# Patient Record
Sex: MALE | Race: BLACK OR AFRICAN AMERICAN | Employment: UNEMPLOYED | URBAN - METROPOLITAN AREA
[De-identification: names, ages, dates, MRNs, and addresses within clinical notes are randomized per-mention and may not be internally consistent; named-entity substitution may affect disease eponyms.]

---

## 2021-03-30 ENCOUNTER — HOSPITAL ENCOUNTER (EMERGENCY)
Facility: CLINIC | Age: 36
Discharge: PSYCHIATRIC OR SUBSTANCE USE FACILITY | End: 2021-03-30
Attending: INTERNAL MEDICINE | Admitting: INTERNAL MEDICINE
Payer: MEDICAID

## 2021-03-30 ENCOUNTER — AMBULATORY - HEALTHEAST (OUTPATIENT)
Dept: CARE COORDINATION | Facility: HOSPITAL | Age: 36
End: 2021-03-30

## 2021-03-30 VITALS
BODY MASS INDEX: 25.9 KG/M2 | OXYGEN SATURATION: 98 % | WEIGHT: 185 LBS | DIASTOLIC BLOOD PRESSURE: 100 MMHG | HEART RATE: 61 BPM | RESPIRATION RATE: 18 BRPM | TEMPERATURE: 98 F | HEIGHT: 71 IN | SYSTOLIC BLOOD PRESSURE: 127 MMHG

## 2021-03-30 DIAGNOSIS — F22 PARANOID DISORDER (H): ICD-10-CM

## 2021-03-30 DIAGNOSIS — R45.851 SUICIDAL IDEATION: ICD-10-CM

## 2021-03-30 LAB
ALBUMIN UR-MCNC: 10 MG/DL
AMPHETAMINES UR QL SCN: POSITIVE
APPEARANCE UR: CLEAR
BARBITURATES UR QL: NEGATIVE
BENZODIAZ UR QL: NEGATIVE
BILIRUB UR QL STRIP: NEGATIVE
CANNABINOIDS UR QL SCN: POSITIVE
COCAINE UR QL: POSITIVE
COLOR UR AUTO: YELLOW
GLUCOSE UR STRIP-MCNC: NEGATIVE MG/DL
HGB UR QL STRIP: NEGATIVE
KETONES UR STRIP-MCNC: NEGATIVE MG/DL
LABORATORY COMMENT REPORT: NORMAL
LEUKOCYTE ESTERASE UR QL STRIP: NEGATIVE
MUCOUS THREADS #/AREA URNS LPF: PRESENT /LPF
NITRATE UR QL: NEGATIVE
OPIATES UR QL SCN: NEGATIVE
PCP UR QL SCN: NEGATIVE
PH UR STRIP: 5.5 PH (ref 5–7)
RBC #/AREA URNS AUTO: 1 /HPF (ref 0–2)
SARS-COV-2 RNA RESP QL NAA+PROBE: NEGATIVE
SOURCE: ABNORMAL
SP GR UR STRIP: 1.03 (ref 1–1.03)
SPECIMEN SOURCE: NORMAL
SQUAMOUS #/AREA URNS AUTO: <1 /HPF (ref 0–1)
UROBILINOGEN UR STRIP-MCNC: NORMAL MG/DL (ref 0–2)
WBC #/AREA URNS AUTO: 3 /HPF (ref 0–5)

## 2021-03-30 PROCEDURE — 80307 DRUG TEST PRSMV CHEM ANLYZR: CPT | Performed by: INTERNAL MEDICINE

## 2021-03-30 PROCEDURE — 99285 EMERGENCY DEPT VISIT HI MDM: CPT | Mod: 25

## 2021-03-30 PROCEDURE — 81001 URINALYSIS AUTO W/SCOPE: CPT | Mod: XU | Performed by: INTERNAL MEDICINE

## 2021-03-30 PROCEDURE — 87635 SARS-COV-2 COVID-19 AMP PRB: CPT | Performed by: INTERNAL MEDICINE

## 2021-03-30 PROCEDURE — 250N000013 HC RX MED GY IP 250 OP 250 PS 637: Performed by: INTERNAL MEDICINE

## 2021-03-30 PROCEDURE — 90791 PSYCH DIAGNOSTIC EVALUATION: CPT

## 2021-03-30 PROCEDURE — C9803 HOPD COVID-19 SPEC COLLECT: HCPCS

## 2021-03-30 RX ORDER — HYDROXYZINE HYDROCHLORIDE 25 MG/1
25 TABLET, FILM COATED ORAL EVERY 4 HOURS PRN
Status: DISCONTINUED | OUTPATIENT
Start: 2021-03-30 | End: 2021-03-31 | Stop reason: HOSPADM

## 2021-03-30 RX ORDER — IBUPROFEN 600 MG/1
600 TABLET, FILM COATED ORAL EVERY 4 HOURS PRN
Status: DISCONTINUED | OUTPATIENT
Start: 2021-03-30 | End: 2021-03-31 | Stop reason: HOSPADM

## 2021-03-30 RX ORDER — QUETIAPINE FUMARATE 200 MG/1
200 TABLET, FILM COATED ORAL AT BEDTIME
COMMUNITY
Start: 2021-03-15

## 2021-03-30 RX ORDER — ACETAMINOPHEN 325 MG/1
650 TABLET ORAL EVERY 4 HOURS PRN
Status: DISCONTINUED | OUTPATIENT
Start: 2021-03-30 | End: 2021-03-31 | Stop reason: HOSPADM

## 2021-03-30 RX ORDER — OLANZAPINE 5 MG/1
10 TABLET, ORALLY DISINTEGRATING ORAL 2 TIMES DAILY PRN
Status: DISCONTINUED | OUTPATIENT
Start: 2021-03-30 | End: 2021-03-31 | Stop reason: HOSPADM

## 2021-03-30 RX ADMIN — OLANZAPINE 10 MG: 5 TABLET, ORALLY DISINTEGRATING ORAL at 18:07

## 2021-03-30 ASSESSMENT — MIFFLIN-ST. JEOR: SCORE: 1806.28

## 2021-03-30 NOTE — ED TRIAGE NOTES
Pt here with suicidal ideation, reports he has had thoughts of uselessness and plan of following through at times. He has had 2 suicide attempts most recently 1 month he was at [facility]. Pt has persistent thoughts. VSS.

## 2021-03-30 NOTE — ED PROVIDER NOTES
"  History   Chief Complaint:  Suicidal     HPI   Hernandez Almanzar is a 35 year old male with history of suicidal ideations, substance abuse, depression and bipolar 1 disorder who presents with suicidal ideations. In the past ten weeks the patient has been seen at the ED eight times for mental health issues and admitted once. Today, the patient states that he has experienced thoughts of uselessness and feels as though people  him but don't fully understand his story. Secondary to this he has had thoughts of jumping off a bridge. He has had two suicide attempts in the past. The most recent being 1 month ago. He is on medications to help his mental health but has not been taking them. He states that he takes whatever drugs he can get, took meth two days ago and marijuana today. He has not seen a specialist for these mental health issues. He denies any fever, cough or vomiting.    Review of Systems   Psychiatric/Behavioral: Positive for suicidal ideas.   All other systems reviewed and are negative.    Allergies:  Divalproex sodium  Fish containing products    Medications:  Seroquel  Melatonin    Past Medical History:    Tobacco use  Methamphetamine dependence  Bipolar 1 disorder  Major depression    Social History:  The patient was unaccompanied to the ED.  Substance use as above    Physical Exam     Patient Vitals for the past 24 hrs:   BP Temp Temp src Pulse Resp SpO2 Height Weight   03/30/21 1838 -- -- -- -- -- 98 % -- --   03/30/21 1837 (!) 145/86 -- -- 70 -- -- -- --   03/30/21 1420 138/84 -- -- -- -- -- -- --   03/30/21 1347 -- -- -- -- -- -- 1.803 m (5' 11\") 83.9 kg (185 lb)   03/30/21 1343 (!) 157/106 98  F (36.7  C) Temporal 101 18 98 % -- --       Physical Exam  HENT:      Mouth/Throat:      Pharynx: No posterior oropharyngeal erythema.   Eyes:      Conjunctiva/sclera: Conjunctivae normal.   Neck:      Musculoskeletal: Neck supple.   Cardiovascular:      Rate and Rhythm: Normal rate and regular rhythm. "      Heart sounds: Normal heart sounds.   Pulmonary:      Effort: Pulmonary effort is normal.      Breath sounds: Normal breath sounds.   Abdominal:      General: Bowel sounds are normal. There is no distension.      Palpations: Abdomen is soft.      Tenderness: There is no abdominal tenderness. There is no guarding or rebound.   Musculoskeletal: Normal range of motion.   Skin:     General: Skin is warm and dry.   Neurological:      Mental Status: He is alert.   Psychiatric:         Thought Content: Thought content includes suicidal ideation. Thought content includes suicidal plan.      Comments: Seems suspicious  Difficult to understand, slow to answer         Emergency Department Course   Laboratory:  UA with Microscopic: Protein Albumin 10 (A), Mucous Urine Present (A) o/w Negative    Drug abuse screen 77 urine: Amphetamine Positive (A), Cannabinoids Positive (A), Cocaine Positive (A) o/w WNL    Asymptomatic COVID-19 Virus (Coronavirus) by PCR Nasopharyngeal swab: Negative    Emergency Department Course:  Reviewed:  I reviewed nursing notes, vitals, past medical history and care everywhere    Assessments:  1430 I obtained history and examined the patient as noted above.     I rechecked and updated the patient regarding the laboratory results and the plan for care..    Consults:   9261 I spoke with DEC regarding the patient.    Interventions:  1807 Zyprexa 10mg PO    Disposition:  Transfer    Impression & Plan   Covid-19  Hernandez Almanzar was evaluated during a global COVID-19 pandemic, which necessitated consideration that the patient might be at risk for infection with the SARS-CoV-2 virus that causes COVID-19.   Applicable protocols for evaluation were followed during the patient's care.   COVID-19 was considered as part of the patient's evaluation. The plan for testing is:  a test was obtained during this visit.    CMS Diagnoses: None    Medical Decision Making:    Hernandez Almanzar is a 35 year old male who  presents to the emergency department with suicidal ideation with plan.  He describes paranoid ideation to our mental health worker..  He acknowledges active chemical dependency and has evidence of marijuana, cocaine, and amphetamines in his urine sample.  We were unable to find a reasonable plan to keep him safe as an outpatient.  He will be transferred via [facility] ambulance for inpatient mental health care.      Diagnosis:    ICD-10-CM    1. Suicidal ideation  R45.851    2. Paranoid disorder (H)  F22          Scribe Disclosure:  I, Brian Meneses, am serving as a scribe at 2:30 PM on 3/30/2021 to document services personally performed by Jenni Bonds MD based on my observations and the provider's statements to me.      Jenni Bonds MD  03/30/21 2683

## 2021-03-30 NOTE — PHARMACY-ADMISSION MEDICATION HISTORY
Admission medication history interview status for this patient is complete. See [facility] admission navigator for allergy information, prior to admission medications and immunization status.     Medication history interview source(s):Patient  Medication history resources (including written lists, pill bottles, clinic record):None  Primary pharmacy:[facility]     Changes made to PTA medication list:  Added: ativan, seroquel and melatonin3  Deleted: none  Changed: none    Actions taken by pharmacist (provider contacted, etc):  Checked [facility] to verify ativan/lorazepam dose and frequency. No rx records for ativan were found. Also, checked pharmacy fill records and cannot find a record of the fill. Patients states is taking ativan. Does not know strength or frequency of ativan and is vague about where filled.     Additional medication history information:    Medication reconciliation/reorder completed by provider prior to medication history? No    For patients on insulin therapy: No     Prior to Admission medications    Medication Sig Last Dose Taking? Auth Provider   LORazepam (ATIVAN PO)  Past Week at Unknown time Yes Unknown, Entered By History   melatonin 5 MG tablet Take 5 mg by mouth At Bedtime Past Week at Unknown time Yes Unknown, Entered By History   QUEtiapine (SEROQUEL) 200 MG tablet Take 200 mg by mouth At Bedtime Past Week at Unknown time Yes Unknown, Entered By History

## 2021-03-30 NOTE — SAFE
Hernandez Almanzar  March 30, 2021    Pt has a hx of bipolar disorder, mdd, adhd, and polysubstance abuse. Pt presents to the ED today with suicidal ideation with SI plan to jump off a bridge. Pt also endorses symptoms of paranoia, auditory hallucinations, delusional thinking and emliy-like symptoms. Pt has been to the ED multiple times recently and has been recommended for dual diagnosis treatment. Pt was also hospitalized recently for SI with plan/attempt to walk into traffic while on a bridge. Pt is unable to contract for safety. The recommendation is for pt to be hospitalized for safety and stabilization. Pt is currently voluntary status.       Current Suicidal Ideation/Self-Injurious Concerns/Methods: Jumping (from building, into traffic, etc.) Pt has SI plan to jump off a bridge.     Inappropriate Sexual Behavior: Unknown    Aggression/Homicidal Ideation: Rage. Pt reports of having generalized thoughts of hurting others-denies of anyone specific and denies HI plan.       For additional details see full DEC assessment.       CADEN IzaguirreSW

## 2021-03-30 NOTE — ED NOTES
Patient brought back to room, search complete, all belongings place in plastic bag, sealed, labeled and placed in ERA 5 locked room.  RN aware

## 2021-03-31 ENCOUNTER — COMMUNICATION - HEALTHEAST (OUTPATIENT)
Dept: SCHEDULING | Facility: CLINIC | Age: 36
End: 2021-03-31

## 2021-05-12 ENCOUNTER — HOSPITAL ENCOUNTER (EMERGENCY)
Facility: CLINIC | Age: 36
Discharge: HOME | End: 2021-05-12
Attending: EMERGENCY MEDICINE | Admitting: EMERGENCY MEDICINE

## 2021-05-12 VITALS
HEART RATE: 73 BPM | SYSTOLIC BLOOD PRESSURE: 127 MMHG | RESPIRATION RATE: 18 BRPM | TEMPERATURE: 99.2 F | OXYGEN SATURATION: 98 % | DIASTOLIC BLOOD PRESSURE: 60 MMHG

## 2021-05-12 DIAGNOSIS — F31.9 BIPOLAR I DISORDER (H): ICD-10-CM

## 2021-05-12 DIAGNOSIS — F19.959 SUBSTANCE-INDUCED PSYCHOTIC DISORDER (H): ICD-10-CM

## 2021-05-12 DIAGNOSIS — F15.11 HISTORY OF METHAMPHETAMINE ABUSE (H): ICD-10-CM

## 2021-05-12 LAB
ALBUMIN SERPL-MCNC: 4.1 G/DL (ref 3.4–5)
ALP SERPL-CCNC: 93 U/L (ref 40–150)
ALT SERPL W P-5'-P-CCNC: 29 U/L (ref 0–70)
AMPHETAMINES UR QL SCN: POSITIVE
ANION GAP SERPL CALCULATED.3IONS-SCNC: 4 MMOL/L (ref 3–14)
APAP SERPL-MCNC: <2 MG/L (ref 10–20)
AST SERPL W P-5'-P-CCNC: 28 U/L (ref 0–45)
BARBITURATES UR QL: NEGATIVE
BASOPHILS # BLD AUTO: 0 10E9/L (ref 0–0.2)
BASOPHILS NFR BLD AUTO: 0.3 %
BENZODIAZ UR QL: NEGATIVE
BILIRUB SERPL-MCNC: 1.5 MG/DL (ref 0.2–1.3)
BUN SERPL-MCNC: 18 MG/DL (ref 7–30)
CALCIUM SERPL-MCNC: 8.9 MG/DL (ref 8.5–10.1)
CANNABINOIDS UR QL SCN: POSITIVE
CHLORIDE SERPL-SCNC: 103 MMOL/L (ref 94–109)
CO2 SERPL-SCNC: 28 MMOL/L (ref 20–32)
COCAINE UR QL: POSITIVE
CREAT SERPL-MCNC: 1.41 MG/DL (ref 0.66–1.25)
DIFFERENTIAL METHOD BLD: ABNORMAL
EOSINOPHIL # BLD AUTO: 0.1 10E9/L (ref 0–0.7)
EOSINOPHIL NFR BLD AUTO: 1.6 %
ERYTHROCYTE [DISTWIDTH] IN BLOOD BY AUTOMATED COUNT: 11.7 % (ref 10–15)
ETHANOL SERPL-MCNC: <0.01 G/DL
GFR SERPL CREATININE-BSD FRML MDRD: 65 ML/MIN/{1.73_M2}
GLUCOSE SERPL-MCNC: 98 MG/DL (ref 70–99)
HCT VFR BLD AUTO: 45.5 % (ref 40–53)
HGB BLD-MCNC: 14.4 G/DL (ref 13.3–17.7)
IMM GRANULOCYTES # BLD: 0 10E9/L (ref 0–0.4)
IMM GRANULOCYTES NFR BLD: 0 %
LABORATORY COMMENT REPORT: NORMAL
LYMPHOCYTES # BLD AUTO: 1.3 10E9/L (ref 0.8–5.3)
LYMPHOCYTES NFR BLD AUTO: 42.2 %
MCH RBC QN AUTO: 28.1 PG (ref 26.5–33)
MCHC RBC AUTO-ENTMCNC: 31.6 G/DL (ref 31.5–36.5)
MCV RBC AUTO: 89 FL (ref 78–100)
MONOCYTES # BLD AUTO: 0.4 10E9/L (ref 0–1.3)
MONOCYTES NFR BLD AUTO: 12.4 %
NEUTROPHILS # BLD AUTO: 1.3 10E9/L (ref 1.6–8.3)
NEUTROPHILS NFR BLD AUTO: 43.5 %
NRBC # BLD AUTO: 0 10*3/UL
NRBC BLD AUTO-RTO: 0 /100
OPIATES UR QL SCN: NEGATIVE
PCP UR QL SCN: NEGATIVE
PLATELET # BLD AUTO: 207 10E9/L (ref 150–450)
POTASSIUM SERPL-SCNC: 3.7 MMOL/L (ref 3.4–5.3)
PROT SERPL-MCNC: 7.5 G/DL (ref 6.8–8.8)
RBC # BLD AUTO: 5.13 10E12/L (ref 4.4–5.9)
SALICYLATES SERPL-MCNC: <2 MG/DL
SARS-COV-2 RNA RESP QL NAA+PROBE: NEGATIVE
SODIUM SERPL-SCNC: 135 MMOL/L (ref 133–144)
SPECIMEN SOURCE: NORMAL
WBC # BLD AUTO: 3.1 10E9/L (ref 4–11)

## 2021-05-12 PROCEDURE — 85025 COMPLETE CBC W/AUTO DIFF WBC: CPT | Performed by: EMERGENCY MEDICINE

## 2021-05-12 PROCEDURE — 99285 EMERGENCY DEPT VISIT HI MDM: CPT | Mod: 25

## 2021-05-12 PROCEDURE — 96361 HYDRATE IV INFUSION ADD-ON: CPT

## 2021-05-12 PROCEDURE — 82077 ASSAY SPEC XCP UR&BREATH IA: CPT | Performed by: EMERGENCY MEDICINE

## 2021-05-12 PROCEDURE — 93005 ELECTROCARDIOGRAM TRACING: CPT

## 2021-05-12 PROCEDURE — 80143 DRUG ASSAY ACETAMINOPHEN: CPT | Performed by: EMERGENCY MEDICINE

## 2021-05-12 PROCEDURE — 80179 DRUG ASSAY SALICYLATE: CPT | Performed by: EMERGENCY MEDICINE

## 2021-05-12 PROCEDURE — 250N000011 HC RX IP 250 OP 636: Performed by: EMERGENCY MEDICINE

## 2021-05-12 PROCEDURE — 80053 COMPREHEN METABOLIC PANEL: CPT | Performed by: EMERGENCY MEDICINE

## 2021-05-12 PROCEDURE — 87635 SARS-COV-2 COVID-19 AMP PRB: CPT | Performed by: EMERGENCY MEDICINE

## 2021-05-12 PROCEDURE — 80307 DRUG TEST PRSMV CHEM ANLYZR: CPT | Performed by: EMERGENCY MEDICINE

## 2021-05-12 PROCEDURE — 96360 HYDRATION IV INFUSION INIT: CPT

## 2021-05-12 PROCEDURE — 258N000003 HC RX IP 258 OP 636: Performed by: EMERGENCY MEDICINE

## 2021-05-12 PROCEDURE — C9803 HOPD COVID-19 SPEC COLLECT: HCPCS

## 2021-05-12 PROCEDURE — 96372 THER/PROPH/DIAG INJ SC/IM: CPT | Performed by: EMERGENCY MEDICINE

## 2021-05-12 PROCEDURE — 90791 PSYCH DIAGNOSTIC EVALUATION: CPT

## 2021-05-12 RX ORDER — OLANZAPINE 10 MG/2ML
10 INJECTION, POWDER, FOR SOLUTION INTRAMUSCULAR ONCE
Status: COMPLETED | OUTPATIENT
Start: 2021-05-12 | End: 2021-05-12

## 2021-05-12 RX ORDER — ARIPIPRAZOLE 15 MG/1
15 TABLET ORAL EVERY MORNING
Qty: 30 TABLET | Refills: 0 | Status: SHIPPED | OUTPATIENT
Start: 2021-05-12

## 2021-05-12 RX ADMIN — SODIUM CHLORIDE 1000 ML: 9 INJECTION, SOLUTION INTRAVENOUS at 18:06

## 2021-05-12 RX ADMIN — OLANZAPINE 10 MG: 10 INJECTION, POWDER, FOR SOLUTION INTRAMUSCULAR at 14:58

## 2021-05-12 ASSESSMENT — ENCOUNTER SYMPTOMS
NERVOUS/ANXIOUS: 1
HALLUCINATIONS: 1
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
## 2021-05-12 NOTE — ED NOTES
Bed: ED04  Expected date:   Expected time:   Means of arrival:   Comments:  Bv3 35 y M Paranoid/DANE

## 2021-05-13 LAB — INTERPRETATION ECG - MUSE: NORMAL

## 2021-05-13 NOTE — ED NOTES
Video DEC into to speak with patient.     All lights on and encouraged patient to speak with DEC. Patient continues to close eyes.

## 2021-05-13 NOTE — ED NOTES
Patient states he has no one to pick him up. Encouraged to find ride or way to place where he lives.     Reviewed follow up discharge instructions, DEC paperwork and prescription reviewed with patient. Patient verbalized understanding

## 2021-05-13 NOTE — ED NOTES
Patient up to the bathroom. Provided with scrub top. Off monitors. Ate his supper tray and provided with blanket

## 2021-06-16 PROBLEM — F15.20 SEVERE AMPHETAMINE SUBSTANCE USE DISORDER (H): Chronic | Status: ACTIVE | Noted: 2021-03-31

## 2021-06-16 PROBLEM — F12.20 SEVERE CANNABIS USE DISORDER (H): Chronic | Status: ACTIVE | Noted: 2021-03-31

## 2021-06-16 PROBLEM — F19.94 SUBSTANCE INDUCED MOOD DISORDER (H): Chronic | Status: ACTIVE | Noted: 2021-03-31

## 2021-06-16 PROBLEM — G47.00 INSOMNIA: Chronic | Status: ACTIVE | Noted: 2021-03-31

## 2021-06-16 PROBLEM — F31.64 BIPOLAR AFFECTIVE DISORDER, MIXED, SEVERE, WITH PSYCHOTIC BEHAVIOR (H): Chronic | Status: ACTIVE | Noted: 2021-03-31

## 2021-06-16 NOTE — PROGRESS NOTES
S: 4:52 pm Pt is a 35 yr old male in [facility] ED for SI w/ plan to jump off a bridge report by Ananya NGUYEN:  reports this is pt's 9th visit to the ED for similar presentation this year.   reports pt is paranoid.  Pt reports he is being threatened by people.  Pt reports AH - whispers.  HI - no on in particular or plan.  Pt reports he's not taking his meds.  Hx of bipolar and ADHD.  Utox pos for meth, cocaine and cannabis.  COVID test ordered.  No reported medical concerns.       A: vol      R: 4500 / Silvio

## 2022-11-09 ENCOUNTER — APPOINTMENT (OUTPATIENT)
Dept: URBAN - METROPOLITAN AREA CLINIC 257 | Age: 37
Setting detail: DERMATOLOGY
End: 2022-11-14

## 2022-11-09 DIAGNOSIS — L65.0 TELOGEN EFFLUVIUM: ICD-10-CM

## 2022-11-09 DIAGNOSIS — R53.83 OTHER FATIGUE: ICD-10-CM

## 2022-11-09 DIAGNOSIS — L28.1 PRURIGO NODULARIS: ICD-10-CM

## 2022-11-09 PROCEDURE — OTHER ORDER TESTS: OTHER

## 2022-11-09 PROCEDURE — OTHER VENIPUNCTURE: OTHER

## 2022-11-09 PROCEDURE — OTHER MIPS QUALITY: OTHER

## 2022-11-09 PROCEDURE — 36415 COLL VENOUS BLD VENIPUNCTURE: CPT

## 2022-11-09 PROCEDURE — 99204 OFFICE O/P NEW MOD 45 MIN: CPT

## 2022-11-09 PROCEDURE — OTHER COUNSELING: OTHER

## 2022-11-09 PROCEDURE — OTHER PRESCRIPTION: OTHER

## 2022-11-09 RX ORDER — TRIAMCINOLONE ACETONIDE 1 MG/G
CREAM TOPICAL BID
Qty: 1 | Refills: 1 | Status: ERX | COMMUNITY
Start: 2022-11-09

## 2022-11-09 ASSESSMENT — LOCATION DETAILED DESCRIPTION DERM
LOCATION DETAILED: MID-FRONTAL SCALP
LOCATION DETAILED: SUPERIOR MID FOREHEAD
LOCATION DETAILED: RIGHT PROXIMAL PRETIBIAL REGION

## 2022-11-09 ASSESSMENT — LOCATION SIMPLE DESCRIPTION DERM
LOCATION SIMPLE: SUPERIOR FOREHEAD
LOCATION SIMPLE: RIGHT PRETIBIAL REGION
LOCATION SIMPLE: ANTERIOR SCALP

## 2022-11-09 ASSESSMENT — LOCATION ZONE DERM
LOCATION ZONE: SCALP
LOCATION ZONE: FACE
LOCATION ZONE: LEG

## 2022-11-09 NOTE — PROCEDURE: COUNSELING
Detail Level: Detailed
Detail Level: Simple
Detail Level: Zone
Patient Specific Counseling (Will Not Stick From Patient To Patient): He is seeing a psychologist and has a f/u tomorrow.  No SI/HI.

## 2022-11-09 NOTE — PROCEDURE: VENIPUNCTURE
Bill 15861 For Specimen Handling/Conveyance To Laboratory?: no
Venipuncture Paragraph: An alcohol pad was applied to the venipuncture site. Venipuncture was performed using a butterfly needle. Pressure and a bandaid was applied to the site. No complications were noted.
Detail Level: None
Number Of Tubes Drawn: 3